# Patient Record
Sex: MALE | URBAN - NONMETROPOLITAN AREA
[De-identification: names, ages, dates, MRNs, and addresses within clinical notes are randomized per-mention and may not be internally consistent; named-entity substitution may affect disease eponyms.]

---

## 2023-01-20 ENCOUNTER — OFFICE VISIT (OUTPATIENT)
Dept: PODIATRY | Age: 63
End: 2023-01-20
Payer: COMMERCIAL

## 2023-01-20 VITALS
SYSTOLIC BLOOD PRESSURE: 132 MMHG | DIASTOLIC BLOOD PRESSURE: 80 MMHG | WEIGHT: 232 LBS | BODY MASS INDEX: 31.42 KG/M2 | HEART RATE: 62 BPM | HEIGHT: 72 IN

## 2023-01-20 DIAGNOSIS — I73.9 PAD (PERIPHERAL ARTERY DISEASE) (HCC): ICD-10-CM

## 2023-01-20 DIAGNOSIS — G89.29 TOE PAIN, CHRONIC, LEFT: ICD-10-CM

## 2023-01-20 DIAGNOSIS — M79.675 TOE PAIN, CHRONIC, LEFT: ICD-10-CM

## 2023-01-20 DIAGNOSIS — E11.42 DM TYPE 2 WITH DIABETIC PERIPHERAL NEUROPATHY (HCC): Primary | ICD-10-CM

## 2023-01-20 PROCEDURE — 99203 OFFICE O/P NEW LOW 30 MIN: CPT | Performed by: PODIATRIST

## 2023-01-20 RX ORDER — GLIPIZIDE 5 MG/1
TABLET, FILM COATED, EXTENDED RELEASE ORAL
COMMUNITY
Start: 2022-12-21

## 2023-01-20 RX ORDER — ATORVASTATIN CALCIUM 20 MG/1
TABLET, FILM COATED ORAL
COMMUNITY
Start: 2023-01-16

## 2023-01-20 RX ORDER — ASPIRIN 81 MG/1
81 TABLET, CHEWABLE ORAL DAILY
COMMUNITY

## 2023-01-20 NOTE — PROGRESS NOTES
Subjective:  Lorenzo Moss is a 60 y.o. male who presents to the office today complaining of pain in toes off and on L foot. .  Symptoms began 1 year(s) ago. Patient relates pain is Present. Pain is rated 3 out of 10 and is described as intermittent. Treatments prior to today's visit include: none. Currently denies F/C/N/V. No Known Allergies    Past Medical History:   Diagnosis Date    Hyperlipidemia     Type 2 diabetes mellitus ([facility] [address]        Prior to Admission medications    Medication Sig Start Date End Date Taking? Authorizing Provider   metFORMIN (GLUCOPHAGE) 500 MG tablet  1/16/23  Yes Historical Provider, MD   glipiZIDE (GLUCOTROL XL) 5 MG extended release tablet Take 1 tablet (5 mg total) by mouth daily. 12/21/22  Yes Historical Provider, MD   atorvastatin (LIPITOR) 20 MG tablet  1/16/23  Yes Historical Provider, MD   aspirin 81 MG chewable tablet Take 81 mg by mouth daily   Yes Historical Provider, MD       History reviewed. No pertinent surgical history. Family History   Problem Relation Age of Onset    Cancer Mother     Diabetes Father     Heart Disease Father        Social History     Tobacco Use    Smoking status: Some Days     Types: Cigars    Smokeless tobacco: Never   Substance Use Topics    Alcohol use: Never       ROS: All 14 ROS systems reviewed and pertinent positives noted above, all others negative. Lower Extremity Physical Examination:     Vitals:   Vitals:    01/20/23 0821   BP: 132/80   Pulse: 62     General: AAO x 3 in NAD. Vascular: DP and PT pulses palpable 1/4, bilateral.  CFT <5 seconds, bilateral.  Hair growth diminished to the level of the digits, bilateral.  Edema present, bilateral.  Varicosities present, bilateral. Erythema present distal toes L 1245.   Distal Rubor absent bilateral.  Temperature decreased bilateral. Hyperpigmentation present bilateral. Atrophic skin no  Neurological: Sensation Impaired to light touch to level of digits, bilateral.  Protective sensation intact  10/10 sites via 5.07/10g [place] Monofilament, bilateral.  negative Tinel's, bilateral.  negative Valleix sign, bilateral.  Vibratory abnormal  bilateral.  Reflexes Decreased bilateral.  Paresthesias positive. Dysthesias negative. Sharp/dull intact bilateral.   Musculoskeletal: Muscle strength 5/5, bilateral.  Pain absent upon palpation bilateral. Normal medial longitudinal arch, bilateral.  Ankle ROM decreased,bilateral.  1st MPJ ROM within normal limits, bilateral.  Dorsally contracted digits absent. No other foot deformities. Integument:  Open lesion absent, bilateral.  Interdigital maceration absent to web spaces bilateral.  Nails within normal limits. Fissures absent, bilateral. Hyperkeratotic tissue is absent. Asessment: Patient is a 60 y.o. male with:    Diagnosis Orders   1. DM type 2 with diabetic peripheral neuropathy ([facility] [address]        2. Toe pain, chronic, left        3. PAD (peripheral artery disease) ([facility]            Plan: Patient examined and evaluated. Current condition and treatment options discussed in detail. DM foot ed and exam  Orders Placed This Encounter   Procedures    VL LOWER EXTREMITY ARTERIAL SEGMENTAL PRESSURES W PPG     Standing Status:   Future     Standing Expiration Date:   4/97/7360   The risk complications and outcomes of PAD discussed. Risk factor modification discussed. Rule out PAD. Strong suspicion of microvascular disease also. Appropriate shoe gear discussed. Patient is a Tylenol as needed for pain. Patient low level medical decision making. Patient is acute on copy condition low stable chronic condition. Patient 1 new test ordered. Low risk of morbidity currently. Contact office with any questions/problems/concerns. RTC in 2week(s).

## 2025-06-27 LAB
ALBUMIN: 4.6 G/DL
ALK PHOSPHATASE: 90 U/L
ALT SERPL-CCNC: 22 U/L
AST SERPL-CCNC: 22 U/L
BASOPHILS # BLD: 0.17 X10^3UL
BILIRUB SERPL-MCNC: 0.4 MG/DL
BUN BLDV-MCNC: 16 MG/DL
CALCIUM SERPL-MCNC: 9.6 MG/DL
CHLORIDE BLD-SCNC: 110 MMOL/L
CO2: 23 MMOL/L
CREAT SERPL-MCNC: 0.8 MG/DL
EGFR (CKD-EPI): 98.2 ML/M1.7
EOSINOPHIL # BLD: 0.55 X10^3UL
ERYTHROCYTE [DISTWIDTH] IN BLOOD BY AUTOMATED COUNT: 49.5 FL
GLUCOSE: 252 MG/DL
HCT VFR BLD CALC: 49.3 %
HEMOGLOBIN: 15.5 G/DL
IMMATURE GRANULOCYTES %: 0.11 X10^3UL
LYMPHOCYTES # BLD: 42.07 X10^3UL
MCH RBC QN AUTO: 29.7 PG
MCHC RBC AUTO-ENTMCNC: 31.4 G/DL
MCV RBC AUTO: 94.4 FL
MONOCYTES: 1.61 X10^3UL
NEUTROPHILS: 4.65 X10^3UL
NUCLEATED RED BLOOD CELLS: 0
PLATELET # BLD: 238 X10^3UL
POTASSIUM SERPL-SCNC: 4.7 MMOL/L
RBC # BLD: 5.22 X10^6UL
SODIUM BLD-SCNC: 140 MMOL/L
TOTAL PROTEIN: 8 G/DL
WBC # BLD: 49.16 X10^3UL